# Patient Record
(demographics unavailable — no encounter records)

---

## 2017-09-19 NOTE — DIAGNOSTIC IMAGING REPORT
(RENAL)RETROPERITONEA COMP



HISTORY: Flank pain R10.9 Flank pain, acute



COMPARISON:  None.



FINDINGS:



Right kidney: Maximum dimension 10.6 cm. No evidence for hydronephrosis. Normal

corticomedullary differentiation and cortical thickness.



Left kidney:  Maximum dimension 11.2 cm. Slight fullness left renal pelvis.

Normal corticomedullary differentiation and cortical thickness.



Bladder: No bladder wall thickening. The bilateral ureteral jets were

identified.



IMPRESSION:  

Slight fullness left renal pelvis. Otherwise negative renal ultrasound. 









The above report was generated using voice recognition software.  It may contain

grammatical, syntax or spelling errors.







Electronically signed by:  Edgar Cotter M.D.

9/19/2017 4:04 PM



Dictated Date/Time:  9/19/2017 4:03 PM